# Patient Record
Sex: FEMALE | Race: WHITE | NOT HISPANIC OR LATINO
[De-identification: names, ages, dates, MRNs, and addresses within clinical notes are randomized per-mention and may not be internally consistent; named-entity substitution may affect disease eponyms.]

---

## 2022-09-20 ENCOUNTER — APPOINTMENT (OUTPATIENT)
Dept: ORTHOPEDIC SURGERY | Facility: CLINIC | Age: 77
End: 2022-09-20

## 2022-09-20 VITALS — WEIGHT: 135 LBS | HEIGHT: 66 IN | BODY MASS INDEX: 21.69 KG/M2

## 2022-09-20 DIAGNOSIS — I10 ESSENTIAL (PRIMARY) HYPERTENSION: ICD-10-CM

## 2022-09-20 DIAGNOSIS — Z78.9 OTHER SPECIFIED HEALTH STATUS: ICD-10-CM

## 2022-09-20 PROCEDURE — L4360 PNEUMAT WALKING BOOT PRE CST: CPT | Mod: RT,KX

## 2022-09-20 PROCEDURE — 99213 OFFICE O/P EST LOW 20 MIN: CPT

## 2022-09-20 RX ORDER — MELOXICAM 15 MG/1
15 TABLET ORAL DAILY
Qty: 30 | Refills: 2 | Status: ACTIVE | COMMUNITY
Start: 2022-09-20 | End: 1900-01-01

## 2022-09-20 NOTE — HISTORY OF PRESENT ILLNESS
[Gradual] : gradual [5] : 5 [3] : 3 [Dull/Aching] : dull/aching [Localized] : localized [Sharp] : sharp [Intermittent] : intermittent [Household chores] : household chores [Leisure] : leisure [Work] : work [Sleep] : sleep [Nothing helps with pain getting better] : Nothing helps with pain getting better [Standing] : standing [Walking] : walking [Exercising] : exercising [Stairs] : stairs [Retired] : Work status: retired [de-identified] : 9/20/2022: Pt here for evaluation of right medial ankle pain x 3-4 weeks.\par There is no hx of trauma. Pt denies numbness/tingling.\par Pain is worse with ambulation.\par \par PMH: htn, hyperlipidemia. 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

## 2022-09-20 NOTE — ASSESSMENT
[FreeTextEntry1] : Pt provided a long right CAM walker for ambulation.\par Mobic 15 mg/d prn pain #30 provided. \par Recommend "Even Up" for ambulation. \par Pt instructed to f/u with Dr. Avila

## 2022-09-20 NOTE — IMAGING
[de-identified] : Right foot/ankle examination shows pt to have significant pes planus.\par There is ttp only over the tibialis posterior region and pain is noted with inversion against resistance.\par There is mild medial ankle pain noted with circumduction.\par Pt cannot heel raise on the right (pt is able to perform this on the left with no difficulty).\par Ankle strength is 5/5 in all planes with exception of inversion which is 4/5 due to discomfort.\par There is no ligamentous laxity noted with anterior drawer or Talar Tilt test. \par All digits are nvi with FAROM.\par

## 2022-09-20 NOTE — DATA REVIEWED
[MRI] : MRI [Outside X-rays] : outside x-rays [Right] : of the right [I independently reviewed and interpreted images and report] : I independently reviewed and interpreted images and report [FreeTextEntry1] : Right ankle xray shows no obvious bony pathology. MRI shows moderate insertional posterior tibialis tendinosis with a shoryt segment tear and reactive tenosynovitis as well as soft tissue edema. 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

## 2022-09-20 NOTE — REASON FOR VISIT
[FreeTextEntry2] : 09/20/2022 :MJ FERNANDEZ , a 77 year old female, presents today for right ankle pain onset one month pain swelling\par

## 2022-10-03 ENCOUNTER — APPOINTMENT (OUTPATIENT)
Dept: ORTHOPEDIC SURGERY | Facility: CLINIC | Age: 77
End: 2022-10-03

## 2022-10-03 DIAGNOSIS — S96.911A STRAIN OF UNSPECIFIED MUSCLE AND TENDON AT ANKLE AND FOOT LEVEL, RIGHT FOOT, INITIAL ENCOUNTER: ICD-10-CM

## 2022-10-03 DIAGNOSIS — M76.821 POSTERIOR TIBIAL TENDINITIS, RIGHT LEG: ICD-10-CM

## 2022-10-03 PROCEDURE — 99214 OFFICE O/P EST MOD 30 MIN: CPT

## 2022-10-03 PROCEDURE — L1902: CPT | Mod: RT,KX

## 2022-10-03 RX ORDER — DICLOFENAC SODIUM 50 MG/1
50 TABLET, DELAYED RELEASE ORAL
Qty: 60 | Refills: 1 | Status: ACTIVE | COMMUNITY
Start: 2022-10-03 | End: 1900-01-01

## 2022-10-03 NOTE — PHYSICAL EXAM
[Right] : right foot and ankle [NL (40)] : plantar flexion 40 degrees [5___] : [facility] 5[unfilled]/5 [2+] : posterior tibialis pulse: 2+ [Normal] : saphenous nerve sensation normal [2nd] : 2nd [3rd] : 3rd [4th] : 4th [Mild] : mild swelling of medial ankle [4___] : inversion 4[unfilled]/5 [] : no pain when stressing lateral tarsal metatarsal joint [de-identified] : WB in CAM boot.  [de-identified] : inversion 15 degrees [TWNoteComboBox7] : dorsiflexion 10 degrees

## 2022-10-03 NOTE — DATA REVIEWED
[MRI] : MRI [Right] : of the right [Ankle] : ankle [Report was reviewed and noted in the chart] : The report was reviewed and noted in the chart [I independently reviewed and interpreted images and report] : I independently reviewed and interpreted images and report [FreeTextEntry1] : New moderate insertional posterior tibial tendinosis with a short segment tear and reactive tenosynovitis. Lateral extra-articular hindfoot impingement. Severe TMTJ osteoarthritis.

## 2022-10-03 NOTE — HISTORY OF PRESENT ILLNESS
[Gradual] : gradual [Intermittent] : intermittent [Household chores] : household chores [Leisure] : leisure [Social interactions] : social interactions [Rest] : rest [Sitting] : sitting [Standing] : standing [Walking] : walking [Stairs] : stairs [de-identified] : Pt. is a 77 year old female who presents for evaluation of her RT ankle. Denies trauma. Symptoms since August/September 2022. She was evaluated by Dr. Medeiros and provided a CAM boot for support. WB in boot today. MRI was ordered (findings below). Mobic 15 mg qday prn. No previous injury/problem with RT ankle.  [] : Post Surgical Visit: no [FreeTextEntry1] : right ankle

## 2022-10-11 NOTE — ASSESSMENT
[FreeTextEntry1] : Partial tear posterior tibial tendon. \par WBAT in supportive footwear, air lift brace.\par Ice to affected area.\par Recommend a course of PT.  Hemostasis: Electrodesiccation

## 2022-10-31 ENCOUNTER — APPOINTMENT (OUTPATIENT)
Dept: ORTHOPEDIC SURGERY | Facility: CLINIC | Age: 77
End: 2022-10-31

## 2022-12-19 ENCOUNTER — OFFICE (OUTPATIENT)
Dept: URBAN - METROPOLITAN AREA CLINIC 77 | Facility: CLINIC | Age: 77
Setting detail: OPHTHALMOLOGY
End: 2022-12-19
Payer: MEDICARE

## 2022-12-19 ENCOUNTER — RX ONLY (RX ONLY)
Age: 77
End: 2022-12-19

## 2022-12-19 DIAGNOSIS — F41.1: ICD-10-CM

## 2022-12-19 DIAGNOSIS — G43.109: ICD-10-CM

## 2022-12-19 DIAGNOSIS — H53.16: ICD-10-CM

## 2022-12-19 DIAGNOSIS — H43.813: ICD-10-CM

## 2022-12-19 DIAGNOSIS — H47.20: ICD-10-CM

## 2022-12-19 DIAGNOSIS — H25.13: ICD-10-CM

## 2022-12-19 PROCEDURE — 92083 EXTENDED VISUAL FIELD XM: CPT | Performed by: OPHTHALMOLOGY

## 2022-12-19 PROCEDURE — 99205 OFFICE O/P NEW HI 60 MIN: CPT | Performed by: OPHTHALMOLOGY

## 2022-12-19 PROCEDURE — 92250 FUNDUS PHOTOGRAPHY W/I&R: CPT | Performed by: OPHTHALMOLOGY

## 2022-12-19 ASSESSMENT — TONOMETRY
OD_IOP_MMHG: 16
OS_IOP_MMHG: 15

## 2022-12-19 ASSESSMENT — REFRACTION_MANIFEST
OS_SPHERE: +3.00
OS_ADD: +3.00
OD_ADD: +3.00
OD_CYLINDER: -0.50
OD_VA1: 20/25
OS_VA1: 35
OS_AXIS: 180
OD_AXIS: 80
OD_SPHERE: +1.75
OS_CYLINDER: -1.00

## 2022-12-19 ASSESSMENT — KERATOMETRY
OS_AXISANGLE_DEGREES: 145
OD_K2POWER_DIOPTERS: 43.00
OS_K2POWER_DIOPTERS: 42.75
OS_K1POWER_DIOPTERS: 41.75
OD_AXISANGLE_DEGREES: 031
OD_K1POWER_DIOPTERS: 42.75

## 2022-12-19 ASSESSMENT — AXIALLENGTH_DERIVED
OS_AL: 22.7208
OD_AL: 23.2423
OS_AL: 23.088
OD_AL: 23.0548

## 2022-12-19 ASSESSMENT — SPHEQUIV_DERIVED
OS_SPHEQUIV: 3.5
OD_SPHEQUIV: 2
OD_SPHEQUIV: 1.5
OS_SPHEQUIV: 2.5

## 2022-12-19 ASSESSMENT — VISUAL ACUITY
OS_BCVA: 20/30-2
OD_BCVA: 20/50

## 2022-12-19 ASSESSMENT — CONFRONTATIONAL VISUAL FIELD TEST (CVF)
OD_FINDINGS: FULL
OS_FINDINGS: FULL

## 2022-12-19 ASSESSMENT — REFRACTION_AUTOREFRACTION
OS_SPHERE: +4.50
OD_AXIS: 080
OD_SPHERE: +2.25
OS_CYLINDER: -2.00
OD_CYLINDER: -0.50
OS_AXIS: 37